# Patient Record
Sex: FEMALE | Race: WHITE
[De-identification: names, ages, dates, MRNs, and addresses within clinical notes are randomized per-mention and may not be internally consistent; named-entity substitution may affect disease eponyms.]

---

## 2017-03-27 ENCOUNTER — HOSPITAL ENCOUNTER (OUTPATIENT)
Age: 77
End: 2017-03-27
Payer: MEDICARE

## 2017-03-27 DIAGNOSIS — R30.0: Primary | ICD-10-CM

## 2018-01-15 ENCOUNTER — HOSPITAL ENCOUNTER (OUTPATIENT)
Dept: HOSPITAL 76 - LAB.WCP | Age: 78
Discharge: HOME | End: 2018-01-15
Attending: FAMILY MEDICINE
Payer: MEDICARE

## 2018-01-15 DIAGNOSIS — R30.0: Primary | ICD-10-CM

## 2018-01-15 PROCEDURE — 87086 URINE CULTURE/COLONY COUNT: CPT

## 2018-01-15 PROCEDURE — 87077 CULTURE AEROBIC IDENTIFY: CPT

## 2018-05-22 ENCOUNTER — HOSPITAL ENCOUNTER (OUTPATIENT)
Dept: HOSPITAL 76 - LAB.WCP | Age: 78
Discharge: HOME | End: 2018-05-22
Attending: FAMILY MEDICINE
Payer: MEDICARE

## 2018-05-22 DIAGNOSIS — R30.0: Primary | ICD-10-CM

## 2018-05-22 PROCEDURE — 87181 SC STD AGAR DILUTION PER AGT: CPT

## 2018-05-22 PROCEDURE — 87086 URINE CULTURE/COLONY COUNT: CPT

## 2018-05-22 PROCEDURE — 87077 CULTURE AEROBIC IDENTIFY: CPT

## 2018-05-23 ENCOUNTER — HOSPITAL ENCOUNTER (OUTPATIENT)
Dept: HOSPITAL 76 - LAB.WCP | Age: 78
Discharge: HOME | End: 2018-05-23
Attending: FAMILY MEDICINE
Payer: MEDICARE

## 2018-05-23 DIAGNOSIS — K52.9: Primary | ICD-10-CM

## 2018-05-23 PROCEDURE — 87493 C DIFF AMPLIFIED PROBE: CPT

## 2018-05-23 PROCEDURE — 83630 LACTOFERRIN FECAL (QUAL): CPT

## 2018-05-23 PROCEDURE — 87045 FECES CULTURE AEROBIC BACT: CPT

## 2018-05-23 PROCEDURE — 87329 GIARDIA AG IA: CPT

## 2018-05-23 PROCEDURE — 87046 STOOL CULTR AEROBIC BACT EA: CPT

## 2018-05-23 PROCEDURE — 81599 UNLISTED MAAA: CPT

## 2019-02-21 ENCOUNTER — HOSPITAL ENCOUNTER (OUTPATIENT)
Dept: HOSPITAL 76 - LAB.WCP | Age: 79
Discharge: HOME | End: 2019-02-21
Attending: FAMILY MEDICINE
Payer: MEDICARE

## 2019-02-21 DIAGNOSIS — I10: Primary | ICD-10-CM

## 2019-02-21 LAB
ALBUMIN DIAFP-MCNC: 4 G/DL (ref 3.2–5.5)
ALBUMIN/GLOB SERPL: 1.3 {RATIO} (ref 1–2.2)
ALP SERPL-CCNC: 25 IU/L (ref 42–121)
ALT SERPL W P-5'-P-CCNC: 20 IU/L (ref 10–60)
ANION GAP SERPL CALCULATED.4IONS-SCNC: 12 MMOL/L (ref 6–13)
AST SERPL W P-5'-P-CCNC: 23 IU/L (ref 10–42)
BASOPHILS NFR BLD AUTO: 0 10^3/UL (ref 0–0.1)
BASOPHILS NFR BLD AUTO: 0.8 %
BILIRUB BLD-MCNC: 0.8 MG/DL (ref 0.2–1)
BUN SERPL-MCNC: 25 MG/DL (ref 6–20)
CALCIUM UR-MCNC: 9.7 MG/DL (ref 8.5–10.3)
CHLORIDE SERPL-SCNC: 100 MMOL/L (ref 101–111)
CO2 SERPL-SCNC: 24 MMOL/L (ref 21–32)
CREAT SERPLBLD-SCNC: 1.3 MG/DL (ref 0.4–1)
EOSINOPHIL # BLD AUTO: 0.1 10^3/UL (ref 0–0.7)
EOSINOPHIL NFR BLD AUTO: 1.9 %
ERYTHROCYTE [DISTWIDTH] IN BLOOD BY AUTOMATED COUNT: 12.9 % (ref 12–15)
GFRSERPLBLD MDRD-ARVRAT: 39 ML/MIN/{1.73_M2} (ref 89–?)
GLOBULIN SER-MCNC: 3 G/DL (ref 2.1–4.2)
GLUCOSE SERPL-MCNC: 120 MG/DL (ref 70–100)
HGB UR QL STRIP: 13.9 G/DL (ref 12–16)
LYMPHOCYTES # SPEC AUTO: 1.6 10^3/UL (ref 1.5–3.5)
LYMPHOCYTES NFR BLD AUTO: 32.7 %
MCH RBC QN AUTO: 29 PG (ref 27–31)
MCHC RBC AUTO-ENTMCNC: 33.3 G/DL (ref 32–36)
MCV RBC AUTO: 87.2 FL (ref 81–99)
MONOCYTES # BLD AUTO: 0.4 10^3/UL (ref 0–1)
MONOCYTES NFR BLD AUTO: 8.4 %
NEUTROPHILS # BLD AUTO: 2.7 10^3/UL (ref 1.5–6.6)
NEUTROPHILS # SNV AUTO: 4.8 X10^3/UL (ref 4.8–10.8)
NEUTROPHILS NFR BLD AUTO: 56.2 %
PDW BLD AUTO: 7.4 FL (ref 7.9–10.8)
PLATELET # BLD: 236 10^3/UL (ref 130–450)
PROT SPEC-MCNC: 7 G/DL (ref 6.7–8.2)
RBC MAR: 4.77 10^6/UL (ref 4.2–5.4)
SODIUM SERPLBLD-SCNC: 136 MMOL/L (ref 135–145)

## 2019-02-21 PROCEDURE — 85025 COMPLETE CBC W/AUTO DIFF WBC: CPT

## 2019-02-21 PROCEDURE — 80053 COMPREHEN METABOLIC PANEL: CPT

## 2019-02-21 PROCEDURE — 36415 COLL VENOUS BLD VENIPUNCTURE: CPT

## 2019-09-05 ENCOUNTER — HOSPITAL ENCOUNTER (OUTPATIENT)
Dept: HOSPITAL 76 - LAB.WCP | Age: 79
Discharge: HOME | End: 2019-09-05
Attending: FAMILY MEDICINE
Payer: MEDICARE

## 2019-09-05 DIAGNOSIS — N39.0: Primary | ICD-10-CM

## 2019-09-05 PROCEDURE — 81002 URINALYSIS NONAUTO W/O SCOPE: CPT

## 2019-09-11 ENCOUNTER — HOSPITAL ENCOUNTER (OUTPATIENT)
Dept: HOSPITAL 76 - DI | Age: 79
Discharge: HOME | End: 2019-09-11
Attending: FAMILY MEDICINE
Payer: MEDICARE

## 2019-09-11 DIAGNOSIS — G45.4: Primary | ICD-10-CM

## 2019-09-11 PROCEDURE — 93880 EXTRACRANIAL BILAT STUDY: CPT

## 2019-09-12 NOTE — ULTRASOUND REPORT
Reason:  TRANSIENT GLOBAL AMNESIA

Procedure Date:  09/11/2019   

Accession Number:  [id number] / [id number]                    

Procedure:  US  - Carotid Doppler Complete CPT Code:  

 

FULL RESULT:

 

 

EXAM:

BILATERAL CAROTID AND VERTEBRAL ARTERY DUPLEX DOPPLER ULTRASOUND:

 

EXAM DATE: 9/11/2019 03:48 PM

 

CLINICAL HISTORY: Transient global amnesia.

 

COMPARISON: None.

 

TECHNIQUE: Grayscale imaging, color Doppler, and duplex spectral Doppler 

were used to evaluate the carotid and vertebral arteries bilaterally. 

Static images were obtained.

 

FINDINGS:  No significant plaque is identified in the right or left 

common or internal carotid arteries. Normal antegrade flow is present in 

bilateral vertebral arteries.

 

VELOCITIES (cm/sec):

Right

CCA mid: PSV 93 cm/sec

CCA dist: PSV 79 cm/sec

ICA prox: PSV 79 cm/sec, EDV 12 cm/sec

ICA mid: PSV 78 cm/sec, EDV 13 cm/sec

ICA dist: PSV 91 cm/sec, EDV 17 cm/sec

ECA:  cm/sec

Vert: PSV 52 cm/sec

ICA/CCA: .97

 

Left

CCA mid: PSV 73 cm/sec

CCA dist: PSV 80 cm/sec

ICA prox: PSV 82 cm/sec, EDV 11 cm/sec

ICA mid: PSV 73 cm/sec, EDV 12 cm/sec

ICA dist: PSV 62 cm/sec, EDV 12 cm/sec

ECA: PSV 91 cm/sec

Vert: PSV 67 cm/sec

ICA/CCA: 1.02

 

ICA diameter stenosis:

Right: <50% by velocity and <70% by NASCET criteria.

Left: <50% by velocity and <70% by NASCET criteria.

IMPRESSION:

1. No significant bilateral carotid artery plaquing.

2. In the right carotid artery there are no elevated carotid artery 

velocities to suggest hemodynamically significant stenosis.

3. In the left carotid artery there are no elevated carotid artery 

velocities to suggest hemodynamically significant stenosis.

4. Normal antegrade flow is present in bilateral vertebral arteries.

 

General Recommendations:

 

Stenosis =50% ICA - Follow-up ultrasound 6-12 months

Stenosis <50% ICA - High Risk Patient with plaque - Follow-up ultrasound 

1-2 years

Normal Study but High Risk Patient - Follow-up ultrasound 3-5 years

 

Management recommendations and diagnostic criteria are based on current 

IAC endorsed standards in Carotid Artery Stenosis: Grayscale and Doppler 

Ultrasound Diagnosis.

Validated velocity measurements with angiographic measurements and 

velocity criteria are extrapolated from diameter data as defined by the 

Society of Radiologists in Ultrasound Consensus Conference Radiology 

2003; 229;340-346.

 

[facility]

## 2019-10-22 ENCOUNTER — HOSPITAL ENCOUNTER (EMERGENCY)
Dept: HOSPITAL 76 - ED | Age: 79
LOS: 1 days | Discharge: HOME | End: 2019-10-23
Payer: MEDICARE

## 2019-10-22 VITALS — DIASTOLIC BLOOD PRESSURE: 66 MMHG | SYSTOLIC BLOOD PRESSURE: 147 MMHG

## 2019-10-22 DIAGNOSIS — I10: ICD-10-CM

## 2019-10-22 DIAGNOSIS — R07.9: ICD-10-CM

## 2019-10-22 DIAGNOSIS — K80.20: ICD-10-CM

## 2019-10-22 DIAGNOSIS — K76.89: ICD-10-CM

## 2019-10-22 DIAGNOSIS — R10.12: Primary | ICD-10-CM

## 2019-10-22 LAB
ALBUMIN DIAFP-MCNC: 3.7 G/DL (ref 3.2–5.5)
ALBUMIN/GLOB SERPL: 1.1 {RATIO} (ref 1–2.2)
ALP SERPL-CCNC: 26 IU/L (ref 42–121)
ALT SERPL W P-5'-P-CCNC: 12 IU/L (ref 10–60)
ANION GAP SERPL CALCULATED.4IONS-SCNC: 11 MMOL/L (ref 6–13)
AST SERPL W P-5'-P-CCNC: 16 IU/L (ref 10–42)
BASOPHILS NFR BLD AUTO: 0 10^3/UL (ref 0–0.1)
BASOPHILS NFR BLD AUTO: 0.4 %
BILIRUB BLD-MCNC: 0.7 MG/DL (ref 0.2–1)
BUN SERPL-MCNC: 35 MG/DL (ref 6–20)
CALCIUM UR-MCNC: 8.9 MG/DL (ref 8.5–10.3)
CHLORIDE SERPL-SCNC: 103 MMOL/L (ref 101–111)
CO2 SERPL-SCNC: 23 MMOL/L (ref 21–32)
CREAT SERPLBLD-SCNC: 1.8 MG/DL (ref 0.4–1)
EOSINOPHIL # BLD AUTO: 0.1 10^3/UL (ref 0–0.7)
EOSINOPHIL NFR BLD AUTO: 1.1 %
ERYTHROCYTE [DISTWIDTH] IN BLOOD BY AUTOMATED COUNT: 12.4 % (ref 12–15)
GFRSERPLBLD MDRD-ARVRAT: 27 ML/MIN/{1.73_M2} (ref 89–?)
GLOBULIN SER-MCNC: 3.3 G/DL (ref 2.1–4.2)
GLUCOSE SERPL-MCNC: 163 MG/DL (ref 70–100)
HGB UR QL STRIP: 11.7 G/DL (ref 12–16)
LIPASE SERPL-CCNC: 42 U/L (ref 22–51)
LYMPHOCYTES # SPEC AUTO: 1.7 10^3/UL (ref 1.5–3.5)
LYMPHOCYTES NFR BLD AUTO: 23 %
MCH RBC QN AUTO: 28.9 PG (ref 27–31)
MCHC RBC AUTO-ENTMCNC: 32.8 G/DL (ref 32–36)
MCV RBC AUTO: 88.1 FL (ref 81–99)
MONOCYTES # BLD AUTO: 0.8 10^3/UL (ref 0–1)
MONOCYTES NFR BLD AUTO: 10.4 %
NEUTROPHILS # BLD AUTO: 4.8 10^3/UL (ref 1.5–6.6)
NEUTROPHILS # SNV AUTO: 7.5 X10^3/UL (ref 4.8–10.8)
NEUTROPHILS NFR BLD AUTO: 63.8 %
PDW BLD AUTO: 9 FL (ref 7.9–10.8)
PLATELET # BLD: 239 10^3/UL (ref 130–450)
PROT SPEC-MCNC: 7 G/DL (ref 6.7–8.2)
RBC MAR: 4.05 10^6/UL (ref 4.2–5.4)
SODIUM SERPLBLD-SCNC: 137 MMOL/L (ref 135–145)

## 2019-10-22 PROCEDURE — 96374 THER/PROPH/DIAG INJ IV PUSH: CPT

## 2019-10-22 PROCEDURE — 96376 TX/PRO/DX INJ SAME DRUG ADON: CPT

## 2019-10-22 PROCEDURE — 71046 X-RAY EXAM CHEST 2 VIEWS: CPT

## 2019-10-22 PROCEDURE — 84484 ASSAY OF TROPONIN QUANT: CPT

## 2019-10-22 PROCEDURE — 36415 COLL VENOUS BLD VENIPUNCTURE: CPT

## 2019-10-22 PROCEDURE — 80053 COMPREHEN METABOLIC PANEL: CPT

## 2019-10-22 PROCEDURE — 85025 COMPLETE CBC W/AUTO DIFF WBC: CPT

## 2019-10-22 PROCEDURE — 74176 CT ABD & PELVIS W/O CONTRAST: CPT

## 2019-10-22 PROCEDURE — 83690 ASSAY OF LIPASE: CPT

## 2019-10-22 PROCEDURE — 99284 EMERGENCY DEPT VISIT MOD MDM: CPT

## 2019-10-22 PROCEDURE — 93005 ELECTROCARDIOGRAM TRACING: CPT

## 2019-10-22 NOTE — XRAY REPORT
Reason:  CP

Procedure Date:  10/22/2019   

Accession Number:  [id number] / [id number]                    

Procedure:  XR  - Chest 2 View X-Ray CPT Code:  00069

 

FULL RESULT:

 

 

EXAM:

CHEST RADIOGRAPHY

 

EXAM DATE: 10/22/2019 08:02 PM.

 

CLINICAL HISTORY: CP.

 

COMPARISON: None.

 

TECHNIQUE: 2 views.

 

FINDINGS:

Lungs/Pleura: Mild increased density at the left base. No large effusion 

or pneumothorax. No pulmonary edema.

 

Mediastinum: Heart and mediastinal contours are unremarkable.

 

Other: None.

IMPRESSION:

 

Mild increased density at the left base, may reflect atelectasis or 

pneumonia.

 

[facility]

## 2019-10-22 NOTE — ED PHYSICIAN DOCUMENTATION
History of Present Illness





- Stated complaint


Stated Complaint: L SIDE PX





- Chief complaint


Chief Complaint: General





- History obtained from


History obtained from: Patient





- History of Present Illness


Timing: Last night


Pain level now: 8


Improved by: rest


Worsened by: movement, deep breath in (inspiration), coughing





- Additonal information


Additional information: 





since yesterday, has had gradual onset but steadily worsening LUQ and left low 

anterolateral chest pain that is worse with deep breath in, coughing, movement. 

Denies h/o similar symptoms.





Review of Systems


Constitutional: denies: Fever, Chills, Sweats


Cardiac: reports: Chest pain / pressure.  denies: Palpitations, Pedal edema, 

Calf pain


Respiratory: reports: Cough (mild NP).  denies: Dyspnea


GI: reports: Reviewed and negative


: denies: Dysuria, Frequency





PD PAST MEDICAL HISTORY





- Past Medical History


Past Medical History: Yes


Cardiovascular: Hypertension, High cholesterol


Respiratory: Pneumonia


Endocrine/Autoimmune: None


GI: GERD


: Frequency


HEENT: Glaucoma


Psych: None


Musculoskeletal: None


Derm: None





- Past Surgical History


Past Surgical History: Yes


General: Colonoscopy


/GYN: Hysterectomy


HEENT: Cataracts





- Present Medications


Home Medications: 


                                Ambulatory Orders











 Medication  Instructions  Recorded  Confirmed


 


Fenofibrate Nanocrystallized 145 mg PO DAILY 03/07/14 01/27/16





[Fenofibrate]   


 


Lisinopril [Prinivil] 20 mg PO DAILY 03/07/14 01/27/16


 


Simvastatin [Zocor] 14 mg PO QPM 03/07/14 01/27/16


 


hydroCHLOROthiazide [Hydrodiuril] 25 mg PO DAILY 03/07/14 01/27/16


 


Hydrocodone/Acetaminophen 0.5 - 1 each PO Q6H PRN #10 tablet 10/22/19 





[Hydrocodon-Acetaminophen 5-325]   














- Allergies


Allergies/Adverse Reactions: 


                                    Allergies











Allergy/AdvReac Type Severity Reaction Status Date / Time


 


nystatin Allergy Severe Rash Verified 10/22/19 19:11


 


Sulfa (Sulfonamide Allergy  Unknown Verified 10/22/19 19:11





Antibiotics)     














- Social History


Does the pt smoke?: No


Smoking Status: Never smoker


Does the pt drink ETOH?: No


Does the pt have substance abuse?: No





- Immunizations


Immunizations are current?: Yes





- POLST


Patient has POLST: No





PD ED PE NORMAL





- Vitals


Vital signs reviewed: Yes





- General


General: Alert and oriented X 3, Well developed/nourished, Other (appears to be 

uncomfortable due to pain, more so with movement)





- HEENT


HEENT: Other (pasty/tacky mucous membranes)





- Neck


Neck: Supple, no meningeal sign





- Cardiac


Cardiac: RRR, No murmur





- Respiratory


Respiratory: No respiratory distress, Clear bilaterally





- Abdomen


Abdomen: Soft, Non distended, Other (TTP left abdomen, LLQ>LUQ)





- Back


Back: No CVA TTP, No spinal TTP





- Derm


Derm: Normal color, Warm and dry, No rash





- Extremities


Extremities: No edema





- Neuro


Neuro: Alert and oriented X 3





Results





- Vitals


Vitals: 


                               Vital Signs - 24 hr











  10/22/19 10/22/19 10/22/19





  19:11 19:52 21:25


 


Temperature 36.7 C  


 


Heart Rate 89 73 69


 


Respiratory 18 17 24





Rate   


 


Blood Pressure 145/61 H 155/69 H 144/65 H


 


O2 Saturation 97 98 98














  10/22/19 10/22/19 10/22/19





  22:16 22:56 23:38


 


Temperature   


 


Heart Rate 71 70 69


 


Respiratory 22 20 22





Rate   


 


Blood Pressure 150/82 H 123/67 147/66 H


 


O2 Saturation 96 98 96








                                     Oxygen











O2 Source []                   Room air


 


O2 Source                      Room air

















- Labs


Labs: 


                                Laboratory Tests











  10/22/19 10/22/19 10/22/19





  19:34 19:34 19:34


 


WBC  7.5  


 


RBC  4.05 L  


 


Hgb  11.7 L  


 


Hct  35.7 L  


 


MCV  88.1  


 


MCH  28.9  


 


MCHC  32.8  


 


RDW  12.4  


 


Plt Count  239  


 


MPV  9.0  


 


Neut # (Auto)  4.8  


 


Lymph # (Auto)  1.7  


 


Mono # (Auto)  0.8  


 


Eos # (Auto)  0.1  


 


Baso # (Auto)  0.0  


 


Absolute Nucleated RBC  0.00  


 


Nucleated RBC %  0.0  


 


Sodium   137 


 


Potassium   3.5 


 


Chloride   103 


 


Carbon Dioxide   23 


 


Anion Gap   11.0 


 


BUN   35 H 


 


Creatinine   1.8 H 


 


Estimated GFR (MDRD)   27 L 


 


Glucose   163 H 


 


Calcium   8.9 


 


Total Bilirubin   0.7 


 


AST   16 


 


ALT   12 


 


Alkaline Phosphatase   26 L 


 


Troponin I High Sens    6.5


 


Total Protein   7.0 


 


Albumin   3.7 


 


Globulin   3.3 


 


Albumin/Globulin Ratio   1.1 


 


Lipase   42 














- Rads (name of study)


  ** CT A/P


Radiology: Prelim report reviewed, See rad report





  ** chest xray


Radiology: Prelim report reviewed, See rad report





PD MEDICAL DECISION MAKING





- ED course


Complexity details: reviewed results, re-evaluated patient, considered 

differential, d/w patient


ED course: 





does not c/o abdominal pain but significant tenderness on exam of abdomen, 

LLQ>LUQ, thus CT performed. 


CT A/P does not show acute pathology nor any findings that would explain her 

symptoms. She had good relief with 2mg morphine x 2 doses and is comfortable 

with d/c home.





Departure





- Departure


Disposition: 01 Home, Self Care


Clinical Impression: 


 Abdominal pain





Condition: Good


Instructions:  ED Abdominal Pain Unkn Cause, ED Chest Pain Atypical Unkn Cause


Follow-Up: 


Chu Carlos MD [Primary Care Provider] -  (Call in the morning to 

arrange for next available appointment)


Prescriptions: 


Hydrocodone/Acetaminophen [Hydrocodon-Acetaminophen 5-325] 0.5 - 1 each PO Q6H 

PRN #10 tablet


 PRN Reason: pain


Discharge Date/Time: 10/23/19 00:15

## 2019-10-22 NOTE — CT REPORT
Reason:  LLQ pain

Procedure Date:  10/22/2019   

Accession Number:  [id number] / [id number]                    

Procedure:  CT  - Abdomen/Pelvis WO CPT Code:  

 

FULL RESULT:

 

 

EXAM:

CT ABDOMEN AND PELVIS (CT KUB)

 

EXAM DATE: 10/22/2019 09:48 PM.

 

CLINICAL HISTORY: LLQ pain.

 

COMPARISONS: ABDOMEN/PELVIS W/O 03/19/2014 1:25 PM.

 

TECHNIQUE: Routine helical CT imaging was performed through the abdomen 

and pelvis without intravenous contrast. Lack of intravenous contrast can 

at times limit scan sensitivity, particularly for the detection of 

intraparenchymal and vascular pathology.  Reconstructions: Coronal and 

sagittal.

 

In accordance with CT protocol optimization, one or more of the following 

dose reduction techniques were utilized for this exam: automated exposure 

control, adjustment of mA and/or KV based on patient size, or use of 

iterative reconstructive technique.

 

 

FINDINGS:

 

ABDOMEN:

 

Lung Bases: Incompletely included lower lungs demonstrate geographic 

groundglass in the left lower lobe, probably atelectasis.  Small left 

effusion. Heart size is within normal limits. No basilar effusions. Small 

hiatal hernia.

 

Liver: 2.4 cm left hepatic lobe cyst.

 

Gallbladder/Bile Ducts: Small gallstone. Gallbladder is decompressed. 

Visualized biliary tree is normal caliber.

 

Spleen: Unremarkable.

 

Pancreas: Unremarkable.

 

Adrenal Glands: Unremarkable.

 

Kidneys: No calculi or hydronephrosis.

 

Peritoneum/Mesentery/Bowel:

No free fluid, free air, or collection.

No intestinal obstruction or inflammation. Duodenal diverticulum.

The appendix is within normal limits.

 

Lymph nodes: No mesenteric, periportal, or retroperitoneal 

lymphadenopathy.

 

PELVIS: The bladder is unremarkable for the degree of distention. Uterus 

is absent. No pelvic lymphadenopathy.

 

Retroperitoneum: Abdominal aorta is nonaneurysmal.

 

Bones: No suspicious osseous lesions.

Small fat filled supraumbilical hernia measuring 3.1 cm.

IMPRESSION:

 

No acute unenhanced intra-abdominal abnormalities.

 

Small left pleural effusion.

 

Coronary artery calcifications.

 

Tiny gallstone.

 

[facility]